# Patient Record
Sex: FEMALE | Race: WHITE | NOT HISPANIC OR LATINO | URBAN - METROPOLITAN AREA
[De-identification: names, ages, dates, MRNs, and addresses within clinical notes are randomized per-mention and may not be internally consistent; named-entity substitution may affect disease eponyms.]

---

## 2023-04-29 DIAGNOSIS — E03.9 HYPOTHYROIDISM, UNSPECIFIED TYPE: ICD-10-CM

## 2023-05-01 PROBLEM — F41.1 ANXIETY STATE: Status: ACTIVE | Noted: 2020-06-09

## 2023-05-01 PROBLEM — L65.9 HAIR LOSS: Status: ACTIVE | Noted: 2023-05-01

## 2023-05-01 PROBLEM — N92.6 MENSES, IRREGULAR: Status: ACTIVE | Noted: 2023-05-01

## 2023-05-01 PROBLEM — E16.1 INCREASED INSULIN LEVEL: Status: ACTIVE | Noted: 2023-05-01

## 2023-05-01 PROBLEM — B96.89 BACTERIAL VAGINOSIS: Status: ACTIVE | Noted: 2023-05-01

## 2023-05-01 PROBLEM — E55.9 VITAMIN D DEFICIENCY: Status: ACTIVE | Noted: 2023-05-01

## 2023-05-01 PROBLEM — F33.1 MODERATE RECURRENT MAJOR DEPRESSION (MULTI): Status: ACTIVE | Noted: 2018-04-04

## 2023-05-01 PROBLEM — F41.1 GENERALIZED ANXIETY DISORDER: Status: ACTIVE | Noted: 2018-04-04

## 2023-05-01 PROBLEM — E03.9 HYPOTHYROIDISM: Status: ACTIVE | Noted: 2020-01-30

## 2023-05-01 PROBLEM — N76.0 BACTERIAL VAGINOSIS: Status: ACTIVE | Noted: 2023-05-01

## 2023-05-09 RX ORDER — LEVOTHYROXINE SODIUM 112 UG/1
TABLET ORAL
Qty: 30 TABLET | Refills: 0 | Status: SHIPPED | OUTPATIENT
Start: 2023-05-09 | End: 2023-06-19

## 2023-05-24 ENCOUNTER — APPOINTMENT (OUTPATIENT)
Dept: PRIMARY CARE | Facility: CLINIC | Age: 28
End: 2023-05-24
Payer: COMMERCIAL

## 2023-07-26 DIAGNOSIS — E03.9 HYPOTHYROIDISM, UNSPECIFIED TYPE: ICD-10-CM

## 2023-07-27 RX ORDER — LEVOTHYROXINE SODIUM 112 UG/1
TABLET ORAL
Qty: 30 TABLET | Refills: 0 | OUTPATIENT
Start: 2023-07-27

## 2023-09-19 ENCOUNTER — TELEPHONE (OUTPATIENT)
Dept: PRIMARY CARE | Facility: CLINIC | Age: 28
End: 2023-09-19
Payer: COMMERCIAL

## 2023-09-19 DIAGNOSIS — Z13.220 SCREENING FOR HYPERLIPIDEMIA: ICD-10-CM

## 2023-09-19 DIAGNOSIS — E03.9 HYPOTHYROIDISM, UNSPECIFIED TYPE: Primary | ICD-10-CM

## 2023-09-25 ENCOUNTER — LAB (OUTPATIENT)
Dept: LAB | Facility: LAB | Age: 28
End: 2023-09-25
Payer: COMMERCIAL

## 2023-09-25 DIAGNOSIS — E03.9 HYPOTHYROIDISM, UNSPECIFIED TYPE: ICD-10-CM

## 2023-09-25 DIAGNOSIS — Z13.220 SCREENING FOR HYPERLIPIDEMIA: ICD-10-CM

## 2023-09-25 LAB
ALANINE AMINOTRANSFERASE (SGPT) (U/L) IN SER/PLAS: 14 U/L (ref 7–45)
ALBUMIN (G/DL) IN SER/PLAS: 4.5 G/DL (ref 3.4–5)
ALKALINE PHOSPHATASE (U/L) IN SER/PLAS: 45 U/L (ref 33–110)
ANION GAP IN SER/PLAS: 12 MMOL/L (ref 10–20)
ASPARTATE AMINOTRANSFERASE (SGOT) (U/L) IN SER/PLAS: 14 U/L (ref 9–39)
BILIRUBIN TOTAL (MG/DL) IN SER/PLAS: 1.7 MG/DL (ref 0–1.2)
CALCIDIOL (25 OH VITAMIN D3) (NG/ML) IN SER/PLAS: 8 NG/ML
CALCIUM (MG/DL) IN SER/PLAS: 9.5 MG/DL (ref 8.6–10.3)
CARBON DIOXIDE, TOTAL (MMOL/L) IN SER/PLAS: 23 MMOL/L (ref 21–32)
CHLORIDE (MMOL/L) IN SER/PLAS: 108 MMOL/L (ref 98–107)
CHOLESTEROL (MG/DL) IN SER/PLAS: 147 MG/DL (ref 0–199)
CHOLESTEROL IN HDL (MG/DL) IN SER/PLAS: 36.1 MG/DL
CHOLESTEROL/HDL RATIO: 4.1
CREATININE (MG/DL) IN SER/PLAS: 0.76 MG/DL (ref 0.5–1.05)
ERYTHROCYTE DISTRIBUTION WIDTH (RATIO) BY AUTOMATED COUNT: 12.1 % (ref 11.5–14.5)
ERYTHROCYTE MEAN CORPUSCULAR HEMOGLOBIN CONCENTRATION (G/DL) BY AUTOMATED: 33.1 G/DL (ref 32–36)
ERYTHROCYTE MEAN CORPUSCULAR VOLUME (FL) BY AUTOMATED COUNT: 89 FL (ref 80–100)
ERYTHROCYTES (10*6/UL) IN BLOOD BY AUTOMATED COUNT: 4.88 X10E12/L (ref 4–5.2)
GFR FEMALE: >90 ML/MIN/1.73M2
GLUCOSE (MG/DL) IN SER/PLAS: 87 MG/DL (ref 74–99)
HEMATOCRIT (%) IN BLOOD BY AUTOMATED COUNT: 43.2 % (ref 36–46)
HEMOGLOBIN (G/DL) IN BLOOD: 14.3 G/DL (ref 12–16)
LDL: 75 MG/DL (ref 0–99)
LEUKOCYTES (10*3/UL) IN BLOOD BY AUTOMATED COUNT: 9.2 X10E9/L (ref 4.4–11.3)
PLATELETS (10*3/UL) IN BLOOD AUTOMATED COUNT: 343 X10E9/L (ref 150–450)
POTASSIUM (MMOL/L) IN SER/PLAS: 4 MMOL/L (ref 3.5–5.3)
PROTEIN TOTAL: 7.1 G/DL (ref 6.4–8.2)
SODIUM (MMOL/L) IN SER/PLAS: 139 MMOL/L (ref 136–145)
THYROTROPIN (MIU/L) IN SER/PLAS BY DETECTION LIMIT <= 0.05 MIU/L: 5.42 MIU/L (ref 0.44–3.98)
THYROXINE (T4) FREE (NG/DL) IN SER/PLAS: 0.89 NG/DL (ref 0.61–1.12)
TRIGLYCERIDE (MG/DL) IN SER/PLAS: 178 MG/DL (ref 0–149)
UREA NITROGEN (MG/DL) IN SER/PLAS: 8 MG/DL (ref 6–23)
VLDL: 36 MG/DL (ref 0–40)

## 2023-09-25 PROCEDURE — 36415 COLL VENOUS BLD VENIPUNCTURE: CPT

## 2023-09-25 PROCEDURE — 85027 COMPLETE CBC AUTOMATED: CPT

## 2023-09-25 PROCEDURE — 80053 COMPREHEN METABOLIC PANEL: CPT

## 2023-09-25 PROCEDURE — 84443 ASSAY THYROID STIM HORMONE: CPT

## 2023-09-25 PROCEDURE — 80061 LIPID PANEL: CPT

## 2023-09-25 PROCEDURE — 82306 VITAMIN D 25 HYDROXY: CPT

## 2023-09-25 PROCEDURE — 84439 ASSAY OF FREE THYROXINE: CPT

## 2023-09-26 ENCOUNTER — OFFICE VISIT (OUTPATIENT)
Dept: PRIMARY CARE | Facility: CLINIC | Age: 28
End: 2023-09-26
Payer: COMMERCIAL

## 2023-09-26 ENCOUNTER — LAB (OUTPATIENT)
Dept: LAB | Facility: LAB | Age: 28
End: 2023-09-26
Payer: COMMERCIAL

## 2023-09-26 VITALS
SYSTOLIC BLOOD PRESSURE: 122 MMHG | WEIGHT: 258 LBS | BODY MASS INDEX: 41.64 KG/M2 | HEART RATE: 72 BPM | TEMPERATURE: 96.8 F | DIASTOLIC BLOOD PRESSURE: 80 MMHG | OXYGEN SATURATION: 98 %

## 2023-09-26 DIAGNOSIS — F41.1 ANXIETY STATE: ICD-10-CM

## 2023-09-26 DIAGNOSIS — E03.8 OTHER SPECIFIED HYPOTHYROIDISM: ICD-10-CM

## 2023-09-26 DIAGNOSIS — Z11.3 SCREENING EXAMINATION FOR STD (SEXUALLY TRANSMITTED DISEASE): ICD-10-CM

## 2023-09-26 DIAGNOSIS — E03.9 HYPOTHYROIDISM, UNSPECIFIED TYPE: ICD-10-CM

## 2023-09-26 DIAGNOSIS — R52 COMPLAINTS OF TOTAL BODY PAIN: ICD-10-CM

## 2023-09-26 DIAGNOSIS — F33.1 MODERATE RECURRENT MAJOR DEPRESSION (MULTI): Primary | ICD-10-CM

## 2023-09-26 DIAGNOSIS — R52 TOTAL BODY PAIN: ICD-10-CM

## 2023-09-26 DIAGNOSIS — L98.9 SKIN LESION OF LEFT ARM: ICD-10-CM

## 2023-09-26 DIAGNOSIS — Z20.2 EXPOSURE TO SEXUALLY TRANSMITTED DISEASE (STD): ICD-10-CM

## 2023-09-26 DIAGNOSIS — M25.20 JOINT LAXITY: ICD-10-CM

## 2023-09-26 DIAGNOSIS — E55.9 VITAMIN D DEFICIENCY: ICD-10-CM

## 2023-09-26 PROCEDURE — 99214 OFFICE O/P EST MOD 30 MIN: CPT

## 2023-09-26 PROCEDURE — 86803 HEPATITIS C AB TEST: CPT

## 2023-09-26 PROCEDURE — 87591 N.GONORRHOEAE DNA AMP PROB: CPT

## 2023-09-26 PROCEDURE — 86780 TREPONEMA PALLIDUM: CPT

## 2023-09-26 PROCEDURE — 87389 HIV-1 AG W/HIV-1&-2 AB AG IA: CPT

## 2023-09-26 PROCEDURE — 36415 COLL VENOUS BLD VENIPUNCTURE: CPT

## 2023-09-26 PROCEDURE — 86706 HEP B SURFACE ANTIBODY: CPT

## 2023-09-26 PROCEDURE — 87340 HEPATITIS B SURFACE AG IA: CPT

## 2023-09-26 PROCEDURE — 87491 CHLMYD TRACH DNA AMP PROBE: CPT

## 2023-09-26 RX ORDER — DULOXETIN HYDROCHLORIDE 60 MG/1
60 CAPSULE, DELAYED RELEASE ORAL DAILY
Qty: 90 CAPSULE | Refills: 2 | Status: SHIPPED | OUTPATIENT
Start: 2023-10-29 | End: 2024-04-26

## 2023-09-26 RX ORDER — DULOXETIN HYDROCHLORIDE 30 MG/1
CAPSULE, DELAYED RELEASE ORAL
Qty: 67 CAPSULE | Refills: 0 | Status: SHIPPED | OUTPATIENT
Start: 2023-09-26 | End: 2023-11-02

## 2023-09-26 RX ORDER — LEVOTHYROXINE SODIUM 112 UG/1
TABLET ORAL
Qty: 90 TABLET | Refills: 3 | Status: SHIPPED | OUTPATIENT
Start: 2023-09-26 | End: 2024-03-04 | Stop reason: SDUPTHER

## 2023-09-26 RX ORDER — FLUTICASONE PROPIONATE 50 MCG
2 SPRAY, SUSPENSION (ML) NASAL DAILY
COMMUNITY

## 2023-09-26 RX ORDER — ERGOCALCIFEROL 1.25 MG/1
50000 CAPSULE ORAL
Qty: 8 CAPSULE | Refills: 0 | Status: SHIPPED | OUTPATIENT
Start: 2023-09-26

## 2023-09-26 ASSESSMENT — ENCOUNTER SYMPTOMS
CONSTITUTIONAL NEGATIVE: 1
ENDOCRINE NEGATIVE: 1
EYES NEGATIVE: 1
RESPIRATORY NEGATIVE: 1
MYALGIAS: 1
GASTROINTESTINAL NEGATIVE: 1
NEUROLOGICAL NEGATIVE: 1
ALLERGIC/IMMUNOLOGIC NEGATIVE: 1
HEMATOLOGIC/LYMPHATIC NEGATIVE: 1
PSYCHIATRIC NEGATIVE: 1
CARDIOVASCULAR NEGATIVE: 1
ARTHRALGIAS: 1

## 2023-09-26 NOTE — ASSESSMENT & PLAN NOTE
Pt reporting lesion of left forearm that has appeared a year ago and has grown. Lesion is not tender, rough or bleeding. Possible family history of skin CA    Refer to dermatology for skin cancer screening.

## 2023-09-26 NOTE — PATIENT INSTRUCTIONS
Thank you for coming to see me today.  If you have any questions or concerns following our visit, please contact the office.  Phone: [phone]    Follow up with me in 3 months.     1)  START vitamin D 44325 units weekly for 8 weeks then start over the counter vitamin D 2000 units daily thereafter    2) I am referring you to dermatology - please call [phone] to schedule an appointment.     3) I am referring you to gynecology - please call [phone] to schedule an appointment     4) Obtain non fasting labs, [address].    5) START duloxetine; start 30mg daily for 1 week to make sure you tolerate ok, then increase 60mg (2 capsules) daily. When this prescription runs out, start duloxetine 60mg capsule, take 1 daily

## 2023-09-26 NOTE — ASSESSMENT & PLAN NOTE
Pt reporting possible exposure to STDs. Interested in screening.   Refer to GYN for well woman exam.    Draw STD screening panel.

## 2023-09-26 NOTE — ASSESSMENT & PLAN NOTE
Vitamin D level at 8mg/dL.    Start ergocalciferol 50,000 units weekly x 8 weeks then start OTC vitamin D 2000 units daily  Repeat at next visit

## 2023-09-26 NOTE — PROGRESS NOTES
Subjective   Patient ID: Fozia Gregorio is a 28 y.o. female who presents for hypothyroid follow up .    Pt reports she has history of bilateral hearing loss following an ear infection approximately June 2023. Is following with ENT. Taking flonase to treat. Moderate improvement from time of acute infection.     Diet: Mostly good at home. Attempting better diet. Sometimes take out.   Exercise: attempting to walk 2-3 times per week and play on playground.   Weight: stable.   Water: 3-4 bottles per day.   Sleep: Trouble sleeping and night, due to anxiety. Sleeps during day better. 6 hours per day. Feeling fatigued.   Social: single mom (son 9 years old), apartment. No pets  Professional: going to school for business degree, [employer], [employer]    Review of Systems   Constitutional: Negative.    HENT: Negative.     Eyes: Negative.    Respiratory: Negative.     Cardiovascular: Negative.    Gastrointestinal: Negative.    Endocrine: Negative.    Genitourinary: Negative.    Musculoskeletal:  Positive for arthralgias and myalgias.   Skin: Negative.    Allergic/Immunologic: Negative.    Neurological: Negative.    Hematological: Negative.    Psychiatric/Behavioral: Negative.          Current Outpatient Medications   Medication Sig Dispense Refill    acetaminophen (Tylenol) 325 mg tablet Take by mouth every 4 hours if needed.      DULoxetine (Cymbalta) 30 mg DR capsule Take 1 capsule (30 mg) by mouth once daily for 7 days, THEN 2 capsules (60 mg) once daily. Do not crush or chew.. 67 capsule 0    [START ON 10/29/2023] DULoxetine (Cymbalta) 60 mg DR capsule Take 1 capsule (60 mg) by mouth once daily. Do not crush or chew. Do not start before October 29, 2023. 90 capsule 2    ergocalciferol (Vitamin D-2) 1.25 MG (78417 UT) capsule Take 1 capsule (50,000 Units) by mouth 1 (one) time per week. Once completed, start vitamin D 2000 units once daily over the counter 8 capsule 0    fluticasone (Flonase) 50 mcg/actuation nasal spray  Administer 2 sprays into each nostril once daily. Shake gently. Before first use, prime pump. After use, clean tip and replace cap.      levothyroxine (Synthroid, Levoxyl) 112 mcg tablet Take 1 tablet daily on an empty stomach. 90 tablet 3     No current facility-administered medications for this visit.     Past Surgical History:   Procedure Laterality Date    OTHER SURGICAL HISTORY  2020     section     No family history on file.         Objective     Visit Vitals  /80 (BP Location: Left arm, Patient Position: Sitting, BP Cuff Size: Adult)   Pulse 72   Temp 36 °C (96.8 °F)   Wt 117 kg (258 lb)   SpO2 98%   BMI 41.64 kg/m²   BSA 2.33 m²        Physical Exam  Constitutional:       Appearance: Normal appearance. She is obese.   HENT:      Head: Normocephalic and atraumatic.   Eyes:      Extraocular Movements: Extraocular movements intact.   Cardiovascular:      Rate and Rhythm: Normal rate and regular rhythm.      Pulses: Normal pulses.      Heart sounds: Normal heart sounds.   Pulmonary:      Effort: Pulmonary effort is normal.      Breath sounds: Normal breath sounds.   Abdominal:      General: Bowel sounds are normal.      Palpations: Abdomen is soft.   Musculoskeletal:      Cervical back: Neck supple.   Skin:     General: Skin is warm and dry.      Capillary Refill: Capillary refill takes less than 2 seconds.   Neurological:      General: No focal deficit present.   Psychiatric:         Mood and Affect: Mood normal.           Assessment/Plan   Problem List Items Addressed This Visit       Hypothyroidism     Pt tolerating current therapy.     Continue levothyroxine 112mcg.             Relevant Medications    levothyroxine (Synthroid, Levoxyl) 112 mcg tablet    Moderate recurrent major depression (CMS/HCC) - Primary     Pt reports she is nervous a lot. Has tried medication briefly unsure of what she has taken in past. Was referred to psychology in past did not go.  Interested in trying  medication, not interested in counseling.     WENCESLAO-7 score of 20 severe anxiety.   PHQ-9 score of 21 severe depression.     START duloxetine 60mg per day.          Relevant Medications    DULoxetine (Cymbalta) 30 mg DR capsule    Vitamin D deficiency     Vitamin D level at 8mg/dL.    Start ergocalciferol 50,000 units weekly x 8 weeks then start OTC vitamin D 2000 units daily  Repeat at next visit         Relevant Medications    ergocalciferol (Vitamin D-2) 1.25 MG (62491 UT) capsule    Joint laxity     Pt concerned that she has undiagnosed EDS. Pt reports previous physician was concerned and attempted to send her for testing but she did not go. Pt interested in testing. Pt reports full body joint laxity. Pt denies family history of EDS.     Consider genetic testing for EDS.          Total body pain     Pt reporting muscle and joint pain that have been occurring for years. Nothing exacerbates it, but also it does not improve.     START duloxetine 60mg per day.          Relevant Medications    DULoxetine (Cymbalta) 60 mg DR capsule (Start on 10/29/2023)    Skin lesion of left arm     Pt reporting lesion of left forearm that has appeared a year ago and has grown. Lesion is not tender, rough or bleeding. Possible family history of skin CA    Refer to dermatology for skin cancer screening.          Relevant Orders    Referral to Dermatology    Exposure to sexually transmitted disease (STD)     Pt reporting possible exposure to STDs. Interested in screening.   Refer to GYN for well woman exam.    Draw STD screening panel.          Relevant Orders    Referral to Gynecology     Other Visit Diagnoses       Anxiety state        Relevant Medications    DULoxetine (Cymbalta) 60 mg DR capsule (Start on 10/29/2023)    Complaints of total body pain        Relevant Orders    Follow Up In Primary Care - Established    Screening examination for STD (sexually transmitted disease)        Relevant Orders    C. Trachomatis / N.  Gonorrhoeae, Amplified Detection (Completed)    Hepatitis B Surface Antibody (Completed)    Hepatitis B Surface Antigen (Completed)    Hepatitis C Antibody (Completed)    HIV 1/2 Antigen/Antibody Screen with Reflex to Confirmation (Completed)    Syphilis Screen with Reflex (Completed)            All pertinent lab work and results were reviewed with patient.     Follow up 3 months.     Tika Patel, APRN-CNP

## 2023-09-26 NOTE — ASSESSMENT & PLAN NOTE
Pt concerned that she has undiagnosed EDS. Pt reports previous physician was concerned and attempted to send her for testing but she did not go. Pt interested in testing. Pt reports full body joint laxity. Pt denies family history of EDS.     Consider genetic testing for EDS.

## 2023-09-26 NOTE — ASSESSMENT & PLAN NOTE
Pt reporting muscle and joint pain that have been occurring for years. Nothing exacerbates it, but also it does not improve.     START duloxetine 60mg per day.

## 2023-09-26 NOTE — ASSESSMENT & PLAN NOTE
Pt reports she is nervous a lot. Has tried medication briefly unsure of what she has taken in past. Was referred to psychology in past did not go.  Interested in trying medication, not interested in counseling.     WENCESLAO-7 score of 20 severe anxiety.   PHQ-9 score of 21 severe depression.     START duloxetine 60mg per day.

## 2023-09-27 LAB
CHLAMYDIA TRACH., AMPLIFIED: NEGATIVE
HEPATITIS B VIRUS SURFACE AB (MIU/ML) IN SERUM: <3.1 MIU/ML
HEPATITIS B VIRUS SURFACE AG PRESENCE IN SERUM: NONREACTIVE
HEPATITIS C VIRUS AB PRESENCE IN SERUM: NONREACTIVE
HIV 1/ 2 AG/AB SCREEN: NONREACTIVE
N. GONORRHEA, AMPLIFIED: NEGATIVE
SYPHILIS TOTAL AB: NONREACTIVE

## 2024-01-02 ENCOUNTER — APPOINTMENT (OUTPATIENT)
Dept: PRIMARY CARE | Facility: CLINIC | Age: 29
End: 2024-01-02
Payer: COMMERCIAL

## 2024-01-16 ENCOUNTER — APPOINTMENT (OUTPATIENT)
Dept: PRIMARY CARE | Facility: CLINIC | Age: 29
End: 2024-01-16
Payer: COMMERCIAL

## 2024-02-21 ENCOUNTER — TELEPHONE (OUTPATIENT)
Dept: PRIMARY CARE | Facility: CLINIC | Age: 29
End: 2024-02-21
Payer: COMMERCIAL

## 2024-02-21 NOTE — TELEPHONE ENCOUNTER
Patient called in stating that her son tested positive for strep 2 days ago but has been sick since last Friday. Patient states that she is having a sore throat, fatigue and a stuffy nose. Patient wants to know if Tika can call her in a antibiotic. Patient would like this to be called into [facility] on [address] on her chart.

## 2024-03-04 ENCOUNTER — OFFICE VISIT (OUTPATIENT)
Dept: PRIMARY CARE | Facility: CLINIC | Age: 29
End: 2024-03-04
Payer: COMMERCIAL

## 2024-03-04 ENCOUNTER — LAB (OUTPATIENT)
Dept: LAB | Facility: LAB | Age: 29
End: 2024-03-04
Payer: COMMERCIAL

## 2024-03-04 VITALS
BODY MASS INDEX: 43.23 KG/M2 | RESPIRATION RATE: 20 BRPM | SYSTOLIC BLOOD PRESSURE: 135 MMHG | DIASTOLIC BLOOD PRESSURE: 83 MMHG | HEART RATE: 92 BPM | HEIGHT: 66 IN | TEMPERATURE: 96.2 F | OXYGEN SATURATION: 98 % | WEIGHT: 269 LBS

## 2024-03-04 DIAGNOSIS — E55.9 VITAMIN D DEFICIENCY: ICD-10-CM

## 2024-03-04 DIAGNOSIS — F33.1 MODERATE RECURRENT MAJOR DEPRESSION (MULTI): ICD-10-CM

## 2024-03-04 DIAGNOSIS — E55.9 VITAMIN D DEFICIENCY: Primary | ICD-10-CM

## 2024-03-04 DIAGNOSIS — E03.9 HYPOTHYROIDISM, UNSPECIFIED TYPE: ICD-10-CM

## 2024-03-04 DIAGNOSIS — R52 COMPLAINTS OF TOTAL BODY PAIN: ICD-10-CM

## 2024-03-04 PROBLEM — N76.0 BACTERIAL VAGINOSIS: Status: RESOLVED | Noted: 2023-05-01 | Resolved: 2024-03-04

## 2024-03-04 PROBLEM — B96.89 BACTERIAL VAGINOSIS: Status: RESOLVED | Noted: 2023-05-01 | Resolved: 2024-03-04

## 2024-03-04 LAB
25(OH)D3 SERPL-MCNC: 16 NG/ML (ref 30–100)
T4 FREE SERPL-MCNC: 0.86 NG/DL (ref 0.61–1.12)
TSH SERPL-ACNC: 8.57 MIU/L (ref 0.44–3.98)
VIT B12 SERPL-MCNC: 273 PG/ML (ref 211–911)

## 2024-03-04 PROCEDURE — 36415 COLL VENOUS BLD VENIPUNCTURE: CPT

## 2024-03-04 PROCEDURE — 84443 ASSAY THYROID STIM HORMONE: CPT

## 2024-03-04 PROCEDURE — 82306 VITAMIN D 25 HYDROXY: CPT

## 2024-03-04 PROCEDURE — 1036F TOBACCO NON-USER: CPT

## 2024-03-04 PROCEDURE — 84439 ASSAY OF FREE THYROXINE: CPT

## 2024-03-04 PROCEDURE — 82607 VITAMIN B-12: CPT

## 2024-03-04 PROCEDURE — 99213 OFFICE O/P EST LOW 20 MIN: CPT

## 2024-03-04 RX ORDER — LEVOTHYROXINE SODIUM 112 UG/1
TABLET ORAL
Qty: 90 TABLET | Refills: 3 | Status: SHIPPED | OUTPATIENT
Start: 2024-03-04

## 2024-03-04 SDOH — ECONOMIC STABILITY: FOOD INSECURITY: WITHIN THE PAST 12 MONTHS, THE FOOD YOU BOUGHT JUST DIDN'T LAST AND YOU DIDN'T HAVE MONEY TO GET MORE.: NEVER TRUE

## 2024-03-04 SDOH — ECONOMIC STABILITY: FOOD INSECURITY: WITHIN THE PAST 12 MONTHS, YOU WORRIED THAT YOUR FOOD WOULD RUN OUT BEFORE YOU GOT MONEY TO BUY MORE.: NEVER TRUE

## 2024-03-04 ASSESSMENT — ENCOUNTER SYMPTOMS
DEPRESSION: 0
LOSS OF SENSATION IN FEET: 0
OCCASIONAL FEELINGS OF UNSTEADINESS: 0
GASTROINTESTINAL NEGATIVE: 1
RESPIRATORY NEGATIVE: 1
CONSTITUTIONAL NEGATIVE: 1
PSYCHIATRIC NEGATIVE: 1
ENDOCRINE NEGATIVE: 1
MUSCULOSKELETAL NEGATIVE: 1
CARDIOVASCULAR NEGATIVE: 1
HEMATOLOGIC/LYMPHATIC NEGATIVE: 1
EYES NEGATIVE: 1
NEUROLOGICAL NEGATIVE: 1

## 2024-03-04 ASSESSMENT — LIFESTYLE VARIABLES
HOW MANY STANDARD DRINKS CONTAINING ALCOHOL DO YOU HAVE ON A TYPICAL DAY: PATIENT DOES NOT DRINK
SKIP TO QUESTIONS 9-10: 1
HOW OFTEN DO YOU HAVE SIX OR MORE DRINKS ON ONE OCCASION: NEVER
AUDIT-C TOTAL SCORE: 0
HOW OFTEN DO YOU HAVE A DRINK CONTAINING ALCOHOL: NEVER

## 2024-03-04 ASSESSMENT — PAIN SCALES - GENERAL: PAINLEVEL: 0-NO PAIN

## 2024-03-04 ASSESSMENT — PATIENT HEALTH QUESTIONNAIRE - PHQ9
1. LITTLE INTEREST OR PLEASURE IN DOING THINGS: NOT AT ALL
2. FEELING DOWN, DEPRESSED OR HOPELESS: NOT AT ALL
SUM OF ALL RESPONSES TO PHQ9 QUESTIONS 1 & 2: 0

## 2024-03-04 NOTE — ASSESSMENT & PLAN NOTE
Did not start duloxetine as recommended at last visit due to fear of doing so. Thinks with warmer weather she will be more encouraged to try it.     Advised to start duloxetine 30mg daily

## 2024-03-04 NOTE — LETTER
March 4, 2024     Patient: Fozia Gregorio   YOB: 1995   Date of Visit: 3/4/2024       To Whom It May Concern:    Fozia Gregorio (mother) was seen in my clinic on 3/4/2024 at 1:00 pm. Please excuse Raghu Gregorio for his absence from school on this day to make the appointment.    If you have any questions or concerns, please don't hesitate to call.         Sincerely,         Tika Patel, APRN-CNS        CC: No Recipients

## 2024-03-04 NOTE — PROGRESS NOTES
Subjective   Patient ID: Fozia Gregorio is a 28 y.o. female who presents for frequent illness and evaluation of mass under her tongue.    Was seen for worsening depression at last visit, started on duloxetine to cover depression and body pain.  States she never started duloxetine, was afraid to start it.   Referred to GYN but has not scheduled an appointment.   Referred to derm for skin cancer screening/lesion of left forearm, did not schedule an appointment with dermatology.  Reports trying to get appointments for specialists is difficult due to getting sick every month.     Started ergocalciferol prescribed in the interim, has started over the counter supplementation.     Has new lump in her mouth 2-3 months ago, painful with pressure. Has not had in the past. No bleeding/draining. Last seen by dentistry less than 6 months ago.    Diet: Trying to eat better, sometimes doesn't. 6/7 meals include proteins, veggies. Diet higher in carbs. Sometimes take out. No routine soda, 1 red bull per day  Exercise: attempting to walk 2-3 times per week in warmer weather for about 30 minutes or more if her son is with her and play on playground.   Weight: stable.   Water: 3-4 bottles per day.   Sleep: Trouble sleeping and night, due to anxiety. Sleeps during day better. 6 hours per day. Feeling fatigued.   Social: single mom (son 9 years old), apartment. No pets  Professional: going to school for business degree, [employer]    Review of Systems   Constitutional: Negative.    HENT: Negative.     Eyes: Negative.    Respiratory: Negative.     Cardiovascular: Negative.    Gastrointestinal: Negative.    Endocrine: Negative.    Genitourinary: Negative.    Musculoskeletal: Negative.    Skin: Negative.    Neurological: Negative.    Hematological: Negative.    Psychiatric/Behavioral: Negative.          Current Outpatient Medications   Medication Sig Dispense Refill    acetaminophen (Tylenol) 325 mg tablet Take by mouth every 4 hours if  "needed.      ergocalciferol (Vitamin D-2) 1.25 MG (98800 UT) capsule Take 1 capsule (50,000 Units) by mouth 1 (one) time per week. Once completed, start vitamin D 2000 units once daily over the counter 8 capsule 0    DULoxetine (Cymbalta) 30 mg DR capsule Take 1 capsule (30 mg) by mouth once daily for 7 days, THEN 2 capsules (60 mg) once daily. Do not crush or chew.. 67 capsule 0    DULoxetine (Cymbalta) 60 mg DR capsule Take 1 capsule (60 mg) by mouth once daily. Do not crush or chew. Do not start before 2023. (Patient not taking: Reported on 3/4/2024) 90 capsule 2    fluticasone (Flonase) 50 mcg/actuation nasal spray Administer 2 sprays into each nostril once daily. Shake gently. Before first use, prime pump. After use, clean tip and replace cap.      levothyroxine (Synthroid, Levoxyl) 112 mcg tablet Take 1 tablet daily on an empty stomach. 90 tablet 3     No current facility-administered medications for this visit.     Past Surgical History:   Procedure Laterality Date    OTHER SURGICAL HISTORY  2020     section     No family history on file.   Social History     Tobacco Use    Smoking status: Never     Passive exposure: Never    Smokeless tobacco: Never   Vaping Use    Vaping Use: Never used   Substance Use Topics    Alcohol use: Never    Drug use: Never        Objective     Visit Vitals  /83 (BP Location: Left arm, Patient Position: Sitting, BP Cuff Size: Small adult)   Pulse 92   Temp 35.7 °C (96.2 °F)   Resp 20   Ht 1.676 m (5' 6\")   Wt 122 kg (269 lb)   SpO2 98%   BMI 43.42 kg/m²   Smoking Status Never   BSA 2.38 m²        Physical Exam      Assessment/Plan   Problem List Items Addressed This Visit       Hypothyroidism     TSH from 2023 elevated, T4 normal  Persistent/worsening fatigue, thinks her thyroid levels are off    Continue levothyroxine 112mcg daily         Relevant Medications    levothyroxine (Synthroid, Levoxyl) 112 mcg tablet    Other Relevant Orders    TSH with " reflex to Free T4 if abnormal    Vitamin B12    Moderate recurrent major depression (CMS/HCC)     Did not start duloxetine as recommended at last visit due to fear of doing so. Thinks with warmer weather she will be more encouraged to try it.     Advised to start duloxetine 30mg daily         Vitamin D deficiency - Primary    Relevant Orders    Vitamin D 25-Hydroxy,Total (for eval of Vitamin D levels)     Other Visit Diagnoses       Complaints of total body pain                All pertinent lab work and results were reviewed with patient.     Follow up with me in 6 months     WILLIAM Gaston-CNS

## 2024-03-04 NOTE — ASSESSMENT & PLAN NOTE
TSH from 9/2023 elevated, T4 normal  Persistent/worsening fatigue, thinks her thyroid levels are off    Continue levothyroxine 112mcg daily

## 2024-03-04 NOTE — PATIENT INSTRUCTIONS
Thank you for coming to see me today.  If you have any questions or concerns following our visit, please contact the office.  Phone: [phone]    Follow up with me in 6 months    1)  Get non-fasting labwork in the next 1-2 weeks.  The lab is down the ngo from our office.     2)  Make sure you're taking vitamin D 2000 units daily for low vitamin D    3)  Consider starting duloxetine 30mg daily for mood and body pain    4) I am referring you to gynecology - please call [phone] to schedule an appointment

## 2024-03-28 ENCOUNTER — TELEPHONE (OUTPATIENT)
Dept: OBSTETRICS AND GYNECOLOGY | Facility: CLINIC | Age: 29
End: 2024-03-28
Payer: COMMERCIAL

## 2024-03-28 NOTE — TELEPHONE ENCOUNTER
Patient last seen 3 years ago, she is asking if she can receive refills of Latisha 0.35MG to start taking again or if an visit is needed prior to getting back on the birth control?

## 2024-03-29 NOTE — TELEPHONE ENCOUNTER
Attempted to call patient, no answer and mailbox was full, sent her a [employer] message

## 2024-09-05 ENCOUNTER — APPOINTMENT (OUTPATIENT)
Dept: PRIMARY CARE | Facility: CLINIC | Age: 29
End: 2024-09-05
Payer: COMMERCIAL

## 2024-09-23 ENCOUNTER — APPOINTMENT (OUTPATIENT)
Dept: PRIMARY CARE | Facility: CLINIC | Age: 29
End: 2024-09-23
Payer: COMMERCIAL

## 2025-05-01 DIAGNOSIS — E03.9 HYPOTHYROIDISM, UNSPECIFIED TYPE: ICD-10-CM

## 2025-05-01 RX ORDER — INHALER, ASSIST DEVICES
1 SPACER (EA) MISCELLANEOUS AS NEEDED
COMMUNITY
Start: 2024-11-13

## 2025-05-01 RX ORDER — NORETHINDRONE 0.35 MG/1
1 TABLET ORAL
COMMUNITY
Start: 2025-03-11

## 2025-05-01 RX ORDER — HYOSCYAMINE SULFATE 0.12 MG/1
0.12 TABLET, ORALLY DISINTEGRATING ORAL
COMMUNITY
Start: 2025-02-11

## 2025-05-01 RX ORDER — ALBUTEROL SULFATE 90 UG/1
INHALANT RESPIRATORY (INHALATION)
COMMUNITY
Start: 2024-11-13

## 2025-05-01 RX ORDER — IBUPROFEN 600 MG/1
600 TABLET ORAL EVERY 6 HOURS PRN
COMMUNITY
Start: 2025-02-18

## 2025-05-01 RX ORDER — ACETAMINOPHEN 500 MG
500 TABLET ORAL EVERY 6 HOURS PRN
COMMUNITY
Start: 2025-02-19

## 2025-05-01 RX ORDER — ONDANSETRON 4 MG/1
4 TABLET, ORALLY DISINTEGRATING ORAL EVERY 8 HOURS PRN
COMMUNITY
Start: 2025-02-11

## 2025-05-02 RX ORDER — LEVOTHYROXINE SODIUM 112 UG/1
112 TABLET ORAL DAILY
Qty: 30 TABLET | Refills: 0 | Status: SHIPPED | OUTPATIENT
Start: 2025-05-02

## 2025-08-11 DIAGNOSIS — E03.9 HYPOTHYROIDISM, UNSPECIFIED TYPE: ICD-10-CM

## 2025-08-13 RX ORDER — LEVOTHYROXINE SODIUM 112 UG/1
112 TABLET ORAL DAILY
Qty: 30 TABLET | Refills: 5 | Status: SHIPPED | OUTPATIENT
Start: 2025-08-13